# Patient Record
Sex: FEMALE | Race: BLACK OR AFRICAN AMERICAN | URBAN - METROPOLITAN AREA
[De-identification: names, ages, dates, MRNs, and addresses within clinical notes are randomized per-mention and may not be internally consistent; named-entity substitution may affect disease eponyms.]

---

## 2021-09-01 ENCOUNTER — APPOINTMENT (RX ONLY)
Dept: URBAN - METROPOLITAN AREA CLINIC 95 | Facility: CLINIC | Age: 36
Setting detail: DERMATOLOGY
End: 2021-09-01

## 2021-09-01 DIAGNOSIS — L21.8 OTHER SEBORRHEIC DERMATITIS: ICD-10-CM

## 2021-09-01 DIAGNOSIS — L65.8 OTHER SPECIFIED NONSCARRING HAIR LOSS: ICD-10-CM

## 2021-09-01 PROCEDURE — 99204 OFFICE O/P NEW MOD 45 MIN: CPT

## 2021-09-01 PROCEDURE — ? TREATMENT REGIMEN

## 2021-09-01 PROCEDURE — ? PRESCRIPTION

## 2021-09-01 PROCEDURE — ? COUNSELING

## 2021-09-01 RX ORDER — FLUOCINOLONE ACETONIDE 0.11 MG/ML
OIL TOPICAL
Qty: 118.28 | Refills: 4 | Status: ERX | COMMUNITY
Start: 2021-09-01

## 2021-09-01 RX ADMIN — FLUOCINOLONE ACETONIDE: 0.11 OIL TOPICAL at 00:00

## 2021-09-01 ASSESSMENT — LOCATION SIMPLE DESCRIPTION DERM
LOCATION SIMPLE: RIGHT FOREHEAD
LOCATION SIMPLE: LEFT FOREHEAD

## 2021-09-01 ASSESSMENT — LOCATION ZONE DERM: LOCATION ZONE: FACE

## 2021-09-01 ASSESSMENT — LOCATION DETAILED DESCRIPTION DERM
LOCATION DETAILED: RIGHT LATERAL FOREHEAD
LOCATION DETAILED: LEFT LATERAL FOREHEAD

## 2021-09-01 NOTE — PROCEDURE: TREATMENT REGIMEN
Continue Regimen: Ok to use Ketoconazole shampoo every two weeks
Detail Level: Simple
Initiate Treatment: DSFS oil qd with flares
Initiate Treatment: DSFS oil qd along the edges

## 2021-09-01 NOTE — PROCEDURE: MIPS QUALITY
Quality 130: Documentation Of Current Medications In The Medical Record: Current Medications Documented
Quality 431: Preventive Care And Screening: Unhealthy Alcohol Use - Screening: Patient identified as an unhealthy alcohol user when screened for unhealthy alcohol use using a systematic screening method and received brief counseling
Quality 110: Preventive Care And Screening: Influenza Immunization: Influenza immunization was not ordered or administered, reason not given
Detail Level: Detailed
Quality 226: Preventive Care And Screening: Tobacco Use: Screening And Cessation Intervention: Tobacco Screening not Performed for Medical Reasons

## 2021-09-01 NOTE — HPI: HAIR LOSS
Previous Labs: No
How Did The Hair Loss Occur?: gradual in onset
How Severe Is Your Hair Loss?: severe
Additional History: PCP just gave pt keto shampoo to start using. Pt just picked up shampoo 2 days ago

## 2021-10-27 ENCOUNTER — APPOINTMENT (RX ONLY)
Dept: URBAN - METROPOLITAN AREA CLINIC 95 | Facility: CLINIC | Age: 36
Setting detail: DERMATOLOGY
End: 2021-10-27

## 2021-10-27 DIAGNOSIS — L21.8 OTHER SEBORRHEIC DERMATITIS: ICD-10-CM | Status: IMPROVED

## 2021-10-27 PROCEDURE — 99214 OFFICE O/P EST MOD 30 MIN: CPT

## 2021-10-27 PROCEDURE — ? PRESCRIPTION

## 2021-10-27 PROCEDURE — ? COUNSELING

## 2021-10-27 PROCEDURE — ? TREATMENT REGIMEN

## 2021-10-27 RX ORDER — KETOCONAZOLE 20 MG/ML
SHAMPOO, SUSPENSION TOPICAL
Qty: 120 | Refills: 3 | Status: ERX | COMMUNITY
Start: 2021-10-27

## 2021-10-27 RX ADMIN — KETOCONAZOLE: 20 SHAMPOO, SUSPENSION TOPICAL at 00:00

## 2021-10-27 NOTE — PROCEDURE: MIPS QUALITY
Quality 130: Documentation Of Current Medications In The Medical Record: Current Medications Documented
Quality 431: Preventive Care And Screening: Unhealthy Alcohol Use - Screening: Patient identified as an unhealthy alcohol user when screened for unhealthy alcohol use using a systematic screening method and received brief counseling
Quality 110: Preventive Care And Screening: Influenza Immunization: Influenza immunization was not ordered or administered, reason not given
Detail Level: Detailed
Quality 226: Preventive Care And Screening: Tobacco Use: Screening And Cessation Intervention: Tobacco Screening not Performed for Medical Reasons
No

## 2022-11-03 ENCOUNTER — APPOINTMENT (RX ONLY)
Dept: URBAN - METROPOLITAN AREA CLINIC 95 | Facility: CLINIC | Age: 37
Setting detail: DERMATOLOGY
End: 2022-11-03

## 2022-11-03 DIAGNOSIS — L21.8 OTHER SEBORRHEIC DERMATITIS: ICD-10-CM

## 2022-11-03 PROCEDURE — ? COUNSELING

## 2022-11-03 PROCEDURE — ? TREATMENT REGIMEN

## 2022-11-03 PROCEDURE — ? PRESCRIPTION

## 2022-11-03 PROCEDURE — 99214 OFFICE O/P EST MOD 30 MIN: CPT

## 2022-11-03 RX ORDER — CLOBETASOL PROPIONATE 0.5 MG/ML
SOLUTION TOPICAL
Qty: 50 | Refills: 3 | Status: ERX | COMMUNITY
Start: 2022-11-03

## 2022-11-03 RX ORDER — KETOCONAZOLE 20 MG/ML
SHAMPOO, SUSPENSION TOPICAL
Qty: 120 | Refills: 2 | Status: ERX | COMMUNITY
Start: 2022-11-03

## 2022-11-03 RX ORDER — FLUOCINOLONE ACETONIDE 0.11 MG/ML
OIL TOPICAL
Qty: 118.28 | Refills: 3 | Status: ERX | COMMUNITY
Start: 2022-11-03

## 2022-11-03 RX ADMIN — KETOCONAZOLE: 20 SHAMPOO, SUSPENSION TOPICAL at 00:00

## 2022-11-03 RX ADMIN — CLOBETASOL PROPIONATE: 0.5 SOLUTION TOPICAL at 00:00

## 2022-11-03 RX ADMIN — FLUOCINOLONE ACETONIDE: 0.11 OIL TOPICAL at 00:00

## 2022-11-03 ASSESSMENT — LOCATION SIMPLE DESCRIPTION DERM: LOCATION SIMPLE: SCALP

## 2022-11-03 ASSESSMENT — LOCATION ZONE DERM: LOCATION ZONE: SCALP

## 2022-11-03 ASSESSMENT — LOCATION DETAILED DESCRIPTION DERM: LOCATION DETAILED: LEFT SUPERIOR PARIETAL SCALP

## 2022-11-03 NOTE — PROCEDURE: TREATMENT REGIMEN
Continue Regimen: DSFS oil and Ketoconazole shampoo
Initiate Treatment: Clobetasol scalp solution qd or bid with flares
Plan: Her scalp is not under control, treatment goal is to control red, scaly itchy patches
Detail Level: Simple